# Patient Record
Sex: MALE | Race: WHITE
[De-identification: names, ages, dates, MRNs, and addresses within clinical notes are randomized per-mention and may not be internally consistent; named-entity substitution may affect disease eponyms.]

---

## 2019-07-02 ENCOUNTER — HOSPITAL ENCOUNTER (EMERGENCY)
Dept: HOSPITAL 25 - UCEAST | Age: 39
Discharge: HOME | End: 2019-07-02
Payer: COMMERCIAL

## 2019-07-02 VITALS — SYSTOLIC BLOOD PRESSURE: 113 MMHG | DIASTOLIC BLOOD PRESSURE: 65 MMHG

## 2019-07-02 DIAGNOSIS — M71.522: Primary | ICD-10-CM

## 2019-07-02 PROCEDURE — G0463 HOSPITAL OUTPT CLINIC VISIT: HCPCS

## 2019-07-02 PROCEDURE — 10060 I&D ABSCESS SIMPLE/SINGLE: CPT

## 2019-07-02 PROCEDURE — 99202 OFFICE O/P NEW SF 15 MIN: CPT

## 2019-07-02 NOTE — UC
Elbow Pain





- HPI Summary


HPI Summary: 





Pt presents to  reporting he struck his left elbow approximately 2 hours ago 

while working Pt with mild discomfort. Pt states he noticed approx 1 hour acute 

inflammation to the left elbow no pain, no parestehsia. Pt with full ROM Pt 

unsure last tetanus  No open wounds. no anticoagulants. Not immunocompromised


Medications reviewed 





- History of Current Complaint


Chief Complaint: UCUpperExtremity


Stated Complaint: LT ELLOW INJURY


Time Seen by Provider: 19 11:01


Hx Obtained From: Patient


Onset/Duration: Hours


Severity Currently: None


Pain Intensity: 0


Character: Throbbing





- Allergies/Home Medications


Allergies/Adverse Reactions: 


 Allergies











Allergy/AdvReac Type Severity Reaction Status Date / Time


 


No Known Allergies Allergy   Verified 19 11:00














PMH/Surg Hx/FS Hx/Imm Hx


Previously Healthy: Yes





- Surgical History


Surgical History: Yes


Surgery Procedure, Year, and Place: hernia repair





- Family History


Known Family History: Positive: Non-Contributory





- Social History


Alcohol Use: Occasionally


Substance Use Type: None


Smoking Status (MU): Never Smoked Tobacco





Review of Systems


All Other Systems Reviewed And Are Negative: Yes


Skin: Positive: Other - left elbow swelling of skin





Physical Exam





- Summary


Physical Exam Summary: 





Vital Signs Reviewed: Yes


A+Ox3, no distress


Eyes: Conjunctiva Clear


ENT: Hearing grossly normal  


neck: supple


Respiratory: Positive: No respiratory distress, No accessory muscle use 


Cardiovascular: skin color reflect adequate perfusion  2+ radial, ulnar CBT < 2 

sec


Musculoskeletal Exam: Full AROM left shoulder, left elbow full flex/ext, 

pronate./supinate without pain Pt with markedly swollen olecranon bursa - no 

tenderness. no open wound  no warmth, no reddness, fluctuance  full ROM wrist


Neurological: Positive: Alert,  ambulatory without difficulty + thumb up, a ok, 

finger spread finger cross


Psychological: Positive: Normal Response To proivder


Skin: Positive: no rash, no ecchymosis - see MS





Triage Information Reviewed: Yes


Vital Signs: 


 Initial Vital Signs











Temp  98 F   19 10:57


 


Pulse  58   19 10:57


 


Resp  16   19 10:57


 


BP  113/65   19 10:57


 


Pulse Ox  100   19 10:57














Procedures





- Procedure Summary


Procedure Summary: 





verbal permission to treat


time out completed with RN at bedside


pt prepped in usual, sterile fashion


anesthetized  with 1% lidocaine  2ml


Using 18guage need - withdrew 2.5ml of blood - gentle traction removed 

additional 1ml  


covered with vasoline gauze and coban


pt tolerated well - no pain, no paresthesia


reviewed with pt wound care


s/s infection


return precautions





Diagnostics





- Radiology


  ** No standard instances


Radiology Interpretation Completed By: Radiologist - Patient Name:         

LUCAS TINOCO                                                               

  Medical Record#: [id number] Ordering Physician: Yennifer Esposito MD             

                                                      Acct.#: [id number] :

     1980         Age: 39   Sex: M                                       

                    Location: URGENT [facility] Exam Date: 19 

1110                                                                           

    ADM Status: REG ER Order Information:                         ELBOW LEFT 3+

VWS Accession Number:                          [id number] CPT:                

                       90153 INDICATION: LEFT elbow pain and swelling following 

injury 2 hours ago.  COMPARISON: No relevant prior exams available on the [facility] [place], [place] 

PACS for comparison.  TECHNIQUE: AP, lateral, and oblique views LEFT elbow.  

REPORT AND IMPRESSION:  #.  Significant soft tissue swelling superficial to the 

olecranon process. No subcutaneous emphysema or conspicuous foreign body. 

Consider potential soft tissue plane hematoma or olecranon bursal fluid 

collection. #.  Normal articular alignment and preserved joint spaces.  #.  

Negative for fat pad displacement to indicate effusion.  #.  Negative for 

fracture.   ____________________________________________________________ <

Electronically signed by Andre Lou MD in OV>  19 1142 Dictated By: 

Andre Lou MD Dictated Date/Time: 19 1142 Transcribed Date/Time:  1140 Copy to:    CC:Yennifer Esposito MD; Hoang Mckinnon MD Imaging - [facility]

                                 Imaging - [facility] [place]  [address]             

                          [address] [address] ph [phone])                                     ph ([phone])                                                ph ([phone])         

       This report is only to be considered final once signed by the Provider(s

) as displayed in the "<Electronically Signed by >" field (s). Absence of a  

signature indicates the report is in a draft status and still needs to be 

finalized. In the event this document was created by someone other than the  

signing Provider, the individual initiating the document will be listed in the 

"Entered by:" or "Dictated by:" fields.                                        

                          1 of 1





Elbow Pain Course/Dx





- Course


Course Of Treatment: 





Pt presents with an acutely traumatic olecranon bursitis approx 2-3 hours ago 

with direct trauma  Pt RHD  Full AROM without pain


called PCP - tdap 12   [facility] 3/7/16


d/w pt options:  compress, drain, monitor


after discussion - will drain


Will image - if not chip fx will drain ,apply compression


motrin/apap


cephalexin


strict return precautions


Pt comfortable and in agreement with pain








- Differential Dx/Diagnosis


Provider Diagnosis: 


 Traumatic bursitis








Discharge





- Sign-Out/Discharge


Documenting (check all that apply): Patient Departure


All imaging exams completed and their final reports reviewed: No Studies





- Discharge Plan


Condition: Stable


Disposition: HOME


Prescriptions: 


Cephalexin CAP* [Keflex CAP*] 500 mg PO TID #21 cap


Patient Education Materials:  Elbow Bursitis (ED)


Referrals: 


Hoang Mckinnon MD [Primary Care Provider] - 


Additional Instructions: 


- Keep area clean and covered. Okay to cover the wound with a thin layer of 

antibiotic ointment and a bandage daily - anticipate oozing from the wound today

, this is normal


- Take antibiotics as prescribed until gone


- Okay to alternate ibuprofen (Advil, Motrin) 600mg and tylenol every 3hours 

for pain. Take with food. 


-monitor for signs of infection - reddness, red streaking, increased pain, fever

, odor, or any other concerns you should be re-evaluated


- Contact your doctor or return with questions or concerns








- Billing Disposition and Condition


Condition: STABLE


Disposition: Home